# Patient Record
Sex: MALE | URBAN - METROPOLITAN AREA
[De-identification: names, ages, dates, MRNs, and addresses within clinical notes are randomized per-mention and may not be internally consistent; named-entity substitution may affect disease eponyms.]

---

## 2021-04-09 ENCOUNTER — TELEPHONE (OUTPATIENT)
Dept: NURSING | Facility: CLINIC | Age: 36
End: 2021-04-09

## 2021-04-09 NOTE — TELEPHONE ENCOUNTER
Pt accompanied his wife to the [facility] for her 1st dose of the Pfizer COVID vaccine.     He asked if I could assit him with a concern. Pt received 1st dose of the Moderna COVID vaccine in [place] on 4/1/21 and is unsure how to schedule for the 28-day 2nd dose, not knowing who will have the Moderna.     I assisted pt with registration via [employer] and was able to place an order for the Moderna COVID 2nd dose vaccine.     The [facility] vaccination location will be administering 2nd doses of Moderna on 5/8.     Entered hx documentation into this reacord under immunizations for 1st dose.     Pt is scheduled for 5/8/21 @ 1:00pm at the [facility].     Address given and told to bring vaccination card to appt.     Pt verbalized understanding.     Dia Kemp RN, Supervisor   none

## 2021-04-29 DIAGNOSIS — Z23 ENCOUNTER FOR IMMUNIZATION: Primary | ICD-10-CM

## 2021-05-02 ENCOUNTER — HEALTH MAINTENANCE LETTER (OUTPATIENT)
Age: 36
End: 2021-05-02

## 2021-10-17 ENCOUNTER — HEALTH MAINTENANCE LETTER (OUTPATIENT)
Age: 36
End: 2021-10-17

## 2022-05-29 ENCOUNTER — HEALTH MAINTENANCE LETTER (OUTPATIENT)
Age: 37
End: 2022-05-29

## 2022-10-03 ENCOUNTER — HEALTH MAINTENANCE LETTER (OUTPATIENT)
Age: 37
End: 2022-10-03

## 2023-06-04 ENCOUNTER — HEALTH MAINTENANCE LETTER (OUTPATIENT)
Age: 38
End: 2023-06-04